# Patient Record
Sex: FEMALE | Race: WHITE | NOT HISPANIC OR LATINO | URBAN - METROPOLITAN AREA
[De-identification: names, ages, dates, MRNs, and addresses within clinical notes are randomized per-mention and may not be internally consistent; named-entity substitution may affect disease eponyms.]

---

## 2020-05-05 ENCOUNTER — OUTPATIENT (OUTPATIENT)
Dept: OUTPATIENT SERVICES | Facility: HOSPITAL | Age: 60
LOS: 1 days | End: 2020-05-05

## 2020-05-05 ENCOUNTER — INPATIENT (INPATIENT)
Facility: HOSPITAL | Age: 60
LOS: 4 days | Discharge: HOME | End: 2020-05-10
Admitting: STUDENT IN AN ORGANIZED HEALTH CARE EDUCATION/TRAINING PROGRAM
Payer: MEDICAID

## 2020-05-05 PROCEDURE — 70450 CT HEAD/BRAIN W/O DYE: CPT | Mod: 26

## 2020-05-05 PROCEDURE — 99285 EMERGENCY DEPT VISIT HI MDM: CPT

## 2020-05-05 PROCEDURE — 71045 X-RAY EXAM CHEST 1 VIEW: CPT | Mod: 26

## 2020-05-06 ENCOUNTER — OUTPATIENT (OUTPATIENT)
Dept: OUTPATIENT SERVICES | Facility: HOSPITAL | Age: 60
LOS: 1 days | End: 2020-05-06

## 2020-05-06 PROCEDURE — 71046 X-RAY EXAM CHEST 2 VIEWS: CPT | Mod: 26

## 2020-05-06 PROCEDURE — 73721 MRI JNT OF LWR EXTRE W/O DYE: CPT | Mod: 26,RT

## 2020-05-07 ENCOUNTER — OUTPATIENT (OUTPATIENT)
Dept: OUTPATIENT SERVICES | Facility: HOSPITAL | Age: 60
LOS: 1 days | End: 2020-05-07

## 2020-05-07 PROCEDURE — 93971 EXTREMITY STUDY: CPT | Mod: 26,RT

## 2020-05-07 PROCEDURE — 93926 LOWER EXTREMITY STUDY: CPT | Mod: 26,RT

## 2020-05-07 PROCEDURE — 93306 TTE W/DOPPLER COMPLETE: CPT | Mod: 26

## 2020-05-08 ENCOUNTER — OUTPATIENT (OUTPATIENT)
Dept: OUTPATIENT SERVICES | Facility: HOSPITAL | Age: 60
LOS: 1 days | End: 2020-05-08

## 2020-05-09 ENCOUNTER — OUTPATIENT (OUTPATIENT)
Dept: OUTPATIENT SERVICES | Facility: HOSPITAL | Age: 60
LOS: 1 days | End: 2020-05-09

## 2020-05-10 ENCOUNTER — OUTPATIENT (OUTPATIENT)
Dept: OUTPATIENT SERVICES | Facility: HOSPITAL | Age: 60
LOS: 1 days | End: 2020-05-10

## 2020-10-02 ENCOUNTER — RESULT REVIEW (OUTPATIENT)
Age: 60
End: 2020-10-02

## 2021-11-11 ENCOUNTER — APPOINTMENT (OUTPATIENT)
Dept: CARDIOLOGY | Facility: CLINIC | Age: 61
End: 2021-11-11
Payer: MEDICAID

## 2021-11-11 ENCOUNTER — NON-APPOINTMENT (OUTPATIENT)
Age: 61
End: 2021-11-11

## 2021-11-11 VITALS
HEART RATE: 70 BPM | OXYGEN SATURATION: 97 % | BODY MASS INDEX: 43.59 KG/M2 | SYSTOLIC BLOOD PRESSURE: 134 MMHG | DIASTOLIC BLOOD PRESSURE: 82 MMHG | WEIGHT: 246 LBS | HEIGHT: 63 IN | TEMPERATURE: 97.3 F

## 2021-11-11 DIAGNOSIS — L97.309 NON-PRESSURE CHRONIC ULCER OF UNSPECIFIED ANKLE WITH UNSPECIFIED SEVERITY: ICD-10-CM

## 2021-11-11 DIAGNOSIS — Z87.09 PERSONAL HISTORY OF OTHER DISEASES OF THE RESPIRATORY SYSTEM: ICD-10-CM

## 2021-11-11 DIAGNOSIS — I87.2 VENOUS INSUFFICIENCY (CHRONIC) (PERIPHERAL): ICD-10-CM

## 2021-11-11 DIAGNOSIS — F17.200 NICOTINE DEPENDENCE, UNSPECIFIED, UNCOMPLICATED: ICD-10-CM

## 2021-11-11 DIAGNOSIS — E66.3 OVERWEIGHT: ICD-10-CM

## 2021-11-11 DIAGNOSIS — L97.909 VARICOSE VEINS OF UNSPECIFIED LOWER EXTREMITY WITH ULCER OF UNSPECIFIED SITE: ICD-10-CM

## 2021-11-11 DIAGNOSIS — Z72.0 TOBACCO USE: ICD-10-CM

## 2021-11-11 DIAGNOSIS — I83.009 VARICOSE VEINS OF UNSPECIFIED LOWER EXTREMITY WITH ULCER OF UNSPECIFIED SITE: ICD-10-CM

## 2021-11-11 DIAGNOSIS — Z00.00 ENCOUNTER FOR GENERAL ADULT MEDICAL EXAMINATION W/OUT ABNORMAL FINDINGS: ICD-10-CM

## 2021-11-11 DIAGNOSIS — Z78.9 OTHER SPECIFIED HEALTH STATUS: ICD-10-CM

## 2021-11-11 DIAGNOSIS — R06.02 SHORTNESS OF BREATH: ICD-10-CM

## 2021-11-11 PROCEDURE — 93000 ELECTROCARDIOGRAM COMPLETE: CPT

## 2021-11-11 PROCEDURE — 99204 OFFICE O/P NEW MOD 45 MIN: CPT

## 2021-11-11 NOTE — DISCUSSION/SUMMARY
[FreeTextEntry1] : I will get an echo to evaluate left ventricular function and pulmonary pressures.  She will get fasting blood work to assess her lipid profile.  She will return in 2 months to discuss the results

## 2021-11-11 NOTE — REASON FOR VISIT
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

## 2021-11-11 NOTE — HISTORY OF PRESENT ILLNESS
[FreeTextEntry1] : She has no chest pain\par She has  shortness of breath\par She has no palpitations\par She has no syncope\par She is neurologically intact\par She has  edema\par She has no skin rashes\par

## 2021-11-11 NOTE — PHYSICAL EXAM
[Well Developed] : well developed [Well Nourished] : well nourished [No Acute Distress] : no acute distress [Normal Conjunctiva] : normal conjunctiva [Normal Venous Pressure] : normal venous pressure [No Carotid Bruit] : no carotid bruit [Normal S1, S2] : normal S1, S2 [No Murmur] : no murmur [No Rub] : no rub [No Gallop] : no gallop [Clear Lung Fields] : clear lung fields [Good Air Entry] : good air entry [No Respiratory Distress] : no respiratory distress  [Soft] : abdomen soft [Non Tender] : non-tender [No Masses/organomegaly] : no masses/organomegaly [Normal Bowel Sounds] : normal bowel sounds [Normal Gait] : normal gait [No Cyanosis] : no cyanosis [No Clubbing] : no clubbing [No Rash] : no rash [No Skin Lesions] : no skin lesions [Moves all extremities] : moves all extremities [No Focal Deficits] : no focal deficits [Normal Speech] : normal speech [Normal] : alert and oriented, normal memory [Alert and Oriented] : alert and oriented [Normal memory] : normal memory [de-identified] : Obese

## 2021-11-11 NOTE — REVIEW OF SYSTEMS
[SOB] : shortness of breath [Dyspnea on exertion] : dyspnea during exertion [Lower Ext Edema] : lower extremity edema [Negative] : Heme/Lymph [Chest Discomfort] : no chest discomfort [Leg Claudication] : no intermittent leg claudication [Palpitations] : no palpitations [Orthopnea] : no orthopnea [PND] : no PND [Syncope] : no syncope

## 2021-12-08 ENCOUNTER — APPOINTMENT (OUTPATIENT)
Dept: CARDIOLOGY | Facility: CLINIC | Age: 61
End: 2021-12-08
Payer: MEDICAID

## 2021-12-08 PROCEDURE — 93306 TTE W/DOPPLER COMPLETE: CPT

## 2022-01-25 ENCOUNTER — APPOINTMENT (OUTPATIENT)
Dept: CARDIOLOGY | Facility: CLINIC | Age: 62
End: 2022-01-25

## 2023-08-11 ENCOUNTER — OFFICE (OUTPATIENT)
Dept: URBAN - METROPOLITAN AREA CLINIC 9 | Facility: CLINIC | Age: 63
Setting detail: OPHTHALMOLOGY
End: 2023-08-11
Payer: MEDICAID

## 2023-08-11 DIAGNOSIS — H35.363: ICD-10-CM

## 2023-08-11 DIAGNOSIS — H57.813: ICD-10-CM

## 2023-08-11 DIAGNOSIS — H40.033: ICD-10-CM

## 2023-08-11 DIAGNOSIS — H25.13: ICD-10-CM

## 2023-08-11 DIAGNOSIS — H52.4: ICD-10-CM

## 2023-08-11 PROCEDURE — 92202 OPSCPY EXTND ON/MAC DRAW: CPT | Performed by: OPHTHALMOLOGY

## 2023-08-11 PROCEDURE — 92015 DETERMINE REFRACTIVE STATE: CPT | Performed by: OPHTHALMOLOGY

## 2023-08-11 PROCEDURE — 92014 COMPRE OPH EXAM EST PT 1/>: CPT | Performed by: OPHTHALMOLOGY

## 2023-08-11 ASSESSMENT — TONOMETRY
OD_IOP_MMHG: 18
OS_IOP_MMHG: 19

## 2023-08-11 ASSESSMENT — REFRACTION_MANIFEST
OS_ADD: +2.75
OS_AXIS: 170
OD_VA2: 20/20(J1+)
OD_ADD: +3.00
OS_CYLINDER: +0.50
OD_CYLINDER: +0.25
OD_CYLINDER: +0.25
OD_AXIS: 055
OS_CYLINDER: +0.50
OD_VA2: 20/20(J1+)
OS_ADD: +3.00
OS_VA2: 20/20(J1+)
OS_VA2: 20/20(J1+)
OD_ADD: +2.75
OS_SPHERE: +2.75
OD_VA1: 20/20
OD_SPHERE: +3.00
OD_VA1: 20/20
OU_VA: 20/20
OS_VA1: 20/20
OD_SPHERE: +3.00
OD_AXIS: 055
OU_VA: 20/20
OS_SPHERE: +2.75
OS_VA1: 20/20
OS_AXIS: 170

## 2023-08-11 ASSESSMENT — REFRACTION_AUTOREFRACTION
OS_CYLINDER: +1.00
OD_CYLINDER: +0.75
OS_AXIS: 170
OS_SPHERE: +2.25
OD_AXIS: 005
OD_SPHERE: +2.75

## 2023-08-11 ASSESSMENT — AXIALLENGTH_DERIVED
OD_AL: 22.1472
OS_AL: 22.2767
OS_AL: 22.1902
OD_AL: 22.1472
OD_AL: 22.1472
OS_AL: 22.1902

## 2023-08-11 ASSESSMENT — KERATOMETRY
OD_AXISANGLE_DEGREES: 092
METHOD_AUTO_MANUAL: AUTO
OD_K1POWER_DIOPTERS: 43.75
OD_K2POWER_DIOPTERS: 45.00
OS_AXISANGLE_DEGREES: 085
OS_K2POWER_DIOPTERS: 44.75
OS_K1POWER_DIOPTERS: 44.00

## 2023-08-11 ASSESSMENT — SPHEQUIV_DERIVED
OD_SPHEQUIV: 3.125
OS_SPHEQUIV: 3
OD_SPHEQUIV: 3.125
OS_SPHEQUIV: 3
OD_SPHEQUIV: 3.125
OS_SPHEQUIV: 2.75

## 2023-08-11 ASSESSMENT — REFRACTION_CURRENTRX
OD_OVR_VA: 20/
OS_OVR_VA: 20/
OD_VPRISM_DIRECTION: PROGS
OS_SPHERE: +2.50
OD_SPHERE: +2.25
OD_CYLINDER: +0.25
OS_CYLINDER: SPH
OD_ADD: +2.75
OD_AXIS: 054
OS_ADD: +2.50
OS_VPRISM_DIRECTION: PROGS

## 2023-08-11 ASSESSMENT — LID POSITION - COMMENTS
OS_COMMENTS: BROW PTOSIS
OD_COMMENTS: BROW PTOSIS

## 2023-08-11 ASSESSMENT — VISUAL ACUITY
OS_BCVA: 20/30-2
OD_BCVA: 20/25-

## 2023-08-11 ASSESSMENT — CONFRONTATIONAL VISUAL FIELD TEST (CVF)
OS_FINDINGS: FULL
OD_FINDINGS: FULL

## 2024-02-23 ENCOUNTER — OFFICE (OUTPATIENT)
Dept: URBAN - METROPOLITAN AREA CLINIC 9 | Facility: CLINIC | Age: 64
Setting detail: OPHTHALMOLOGY
End: 2024-02-23
Payer: MEDICAID

## 2024-02-23 DIAGNOSIS — H35.363: ICD-10-CM

## 2024-02-23 DIAGNOSIS — H52.4: ICD-10-CM

## 2024-02-23 DIAGNOSIS — H25.13: ICD-10-CM

## 2024-02-23 DIAGNOSIS — H57.813: ICD-10-CM

## 2024-02-23 DIAGNOSIS — H40.033: ICD-10-CM

## 2024-02-23 PROCEDURE — 92015 DETERMINE REFRACTIVE STATE: CPT | Performed by: OPHTHALMOLOGY

## 2024-02-23 PROCEDURE — 92134 CPTRZ OPH DX IMG PST SGM RTA: CPT | Performed by: OPHTHALMOLOGY

## 2024-02-23 PROCEDURE — 99213 OFFICE O/P EST LOW 20 MIN: CPT | Performed by: OPHTHALMOLOGY

## 2024-02-23 ASSESSMENT — REFRACTION_AUTOREFRACTION
OD_SPHERE: +2.75
OS_SPHERE: +2.25
OD_CYLINDER: +0.75
OS_CYLINDER: +1.00
OS_AXIS: 170
OD_AXIS: 005

## 2024-02-23 ASSESSMENT — REFRACTION_CURRENTRX
OD_AXIS: 054
OS_VPRISM_DIRECTION: PROGS
OD_SPHERE: +2.25
OD_ADD: +2.75
OS_OVR_VA: 20/
OD_VPRISM_DIRECTION: PROGS
OD_OVR_VA: 20/
OS_ADD: +2.75
OS_CYLINDER: SPH
OD_CYLINDER: +0.25
OS_SPHERE: +2.50

## 2024-02-23 ASSESSMENT — REFRACTION_MANIFEST
OS_SPHERE: +2.75
OS_CYLINDER: +0.50
OD_VA1: 20/20
OS_CYLINDER: +0.50
OD_CYLINDER: +0.25
OD_VA2: 20/20(J1+)
OD_VA2: 20/20(J1+)
OS_ADD: +2.75
OS_VA2: 20/20(J1+)
OU_VA: 20/20
OS_ADD: +3.00
OD_CYLINDER: +0.25
OU_VA: 20/20
OS_SPHERE: +2.75
OD_SPHERE: +3.00
OD_AXIS: 055
OD_AXIS: 055
OS_AXIS: 170
OS_AXIS: 170
OD_ADD: +3.00
OS_VA1: 20/20
OS_VA2: 20/20(J1+)
OS_VA1: 20/20
OD_VA1: 20/20
OD_ADD: +2.75
OD_SPHERE: +3.00

## 2024-02-23 ASSESSMENT — CONFRONTATIONAL VISUAL FIELD TEST (CVF)
OD_FINDINGS: FULL
OS_FINDINGS: FULL

## 2024-02-23 ASSESSMENT — SPHEQUIV_DERIVED
OD_SPHEQUIV: 3.125
OS_SPHEQUIV: 2.75
OD_SPHEQUIV: 3.125
OS_SPHEQUIV: 3
OD_SPHEQUIV: 3.125
OS_SPHEQUIV: 3

## 2024-02-23 ASSESSMENT — LID POSITION - COMMENTS
OD_COMMENTS: BROW PTOSIS
OS_COMMENTS: BROW PTOSIS

## 2024-04-01 ENCOUNTER — TRANSCRIPTION ENCOUNTER (OUTPATIENT)
Age: 64
End: 2024-04-01

## 2024-04-02 ENCOUNTER — RESULT REVIEW (OUTPATIENT)
Age: 64
End: 2024-04-02

## 2024-04-03 ENCOUNTER — TRANSCRIPTION ENCOUNTER (OUTPATIENT)
Age: 64
End: 2024-04-03

## 2024-07-17 ENCOUNTER — NON-APPOINTMENT (OUTPATIENT)
Age: 64
End: 2024-07-17

## 2024-07-17 VITALS — BODY MASS INDEX: 42.17 KG/M2 | HEIGHT: 63 IN | WEIGHT: 238 LBS

## 2024-07-17 DIAGNOSIS — Z87.891 PERSONAL HISTORY OF NICOTINE DEPENDENCE: ICD-10-CM

## 2024-07-19 ENCOUNTER — APPOINTMENT (OUTPATIENT)
Dept: ULTRASOUND IMAGING | Facility: CLINIC | Age: 64
End: 2024-07-19
Payer: MEDICAID

## 2024-07-19 PROCEDURE — 76705 ECHO EXAM OF ABDOMEN: CPT | Mod: 59

## 2024-07-19 PROCEDURE — 76981 USE PARENCHYMA: CPT

## 2024-08-08 ENCOUNTER — APPOINTMENT (OUTPATIENT)
Dept: INFECTIOUS DISEASE | Facility: CLINIC | Age: 64
End: 2024-08-08

## 2024-08-08 PROBLEM — K74.60 CIRRHOSIS, NONALCOHOLIC: Status: ACTIVE | Noted: 2024-08-08

## 2024-08-08 PROBLEM — B19.20 HEPATITIS-C: Status: ACTIVE | Noted: 2024-08-08

## 2024-08-08 PROBLEM — G47.30 SLEEP APNEA: Status: ACTIVE | Noted: 2024-08-08

## 2024-08-08 PROCEDURE — 99204 OFFICE O/P NEW MOD 45 MIN: CPT

## 2024-08-08 NOTE — REVIEW OF SYSTEMS
[Feeling Tired] : feeling tired [Feeling Sick] : feeling sick [As Noted in HPI] : as noted in HPI [Skin Lesions] : skin lesion [Skin Wound] : skin wound [Itching] : itching [Negative] : Heme/Lymph

## 2024-08-08 NOTE — PHYSICAL EXAM
[General Appearance - Alert] : alert [General Appearance - In No Acute Distress] : in no acute distress [Sclera] : the sclera and conjunctiva were normal [PERRL With Normal Accommodation] : pupils were equal in size, round, reactive to light [Extraocular Movements] : extraocular movements were intact [Outer Ear] : the ears and nose were normal in appearance [Oropharynx] : the oropharynx was normal with no thrush [Neck Appearance] : the appearance of the neck was normal [Neck Cervical Mass (___cm)] : no neck mass was observed [Jugular Venous Distention Increased] : there was no jugular-venous distention [Thyroid Diffuse Enlargement] : the thyroid was not enlarged [Auscultation Breath Sounds / Voice Sounds] : lungs were clear to auscultation bilaterally [Heart Sounds] : normal S1 and S2 [Heart Rate And Rhythm] : heart rate was normal and rhythm regular [Heart Sounds Gallop] : no gallops [Murmurs] : no murmurs [Heart Sounds Pericardial Friction Rub] : no pericardial rub [Full Pulse] : the pedal pulses are present [Bowel Sounds] : normal bowel sounds [Abdomen Soft] : soft [Abdomen Tenderness] : non-tender [Abdomen Mass (___ Cm)] : no abdominal mass palpated [Costovertebral Angle Tenderness] : no CVA tenderness [No Palpable Adenopathy] : no palpable adenopathy [Musculoskeletal - Swelling] : no joint swelling [Motor Tone] : muscle strength and tone were normal [Nail Clubbing] : no clubbing  or cyanosis of the fingernails [Skin Color & Pigmentation] : normal skin color and pigmentation [] : no rash [FreeTextEntry1] : ULCER in the right medial lower leg, about 3 cm across, clean base.  [Cranial Nerves] : cranial nerves 2-12 were intact [Sensation] : the sensory exam was normal to light touch and pinprick [No Focal Deficits] : no focal deficits [Oriented To Time, Place, And Person] : oriented to person, place, and time [Affect] : the affect was normal

## 2024-08-08 NOTE — DATA REVIEWED
[FreeTextEntry1] : Laboratory/Data Review: - Analysis of lab results reported high AST and ALT levels in the liver. Hepatitis C antibody was undetected, but a high volume of Hepatitis C RNA was found in her blood, indicating active uncontrolled Hepatitis C. Her platelet count was low, suggesting an infection.  It's July 1, 2024  AST was 96, your ALT was 70,   hepatitis C antibody is detected. 2.4 million copies of hepatitis C RNA   It is a log scale of 6.38.  May of 2023, AST was 77, your AMT was 68.  IN  2022,  AST was 81, ALT was 79.  ultrasound  liver on July 19, 2024, shows that your liver is small size, coarse texture, but no liver mass. - high risk of clinical significant fibrosis of the liver.

## 2024-08-08 NOTE — HISTORY OF PRESENT ILLNESS
[FreeTextEntry1] :  This 64 y.o. F with obesity.  hepatitis C and B, hx of Leg ulcers and cellulitis,  hx of Lymphedema.    - The patient reports having vascular issues with legs which have led to ulcers.  Her vascular surgeon is Dr. Lopez is the surgeon at [facility]. It was found that these issues were hereditary, as her father  from cellulitis and sepsis from the leg. She has been seeing a vascular doctor to treat these ulcers. The patient also revealed she had history of drug use, specifically heroin which she used to inject. She stopped using it a long time ago with the last relapse five years ago. However, it was found that she had also contracted Hepatitis B and C, the latter of which she tested positive without having gotten sick from it. Her current primary doctor, Dr. Jessica Jin, noticed that her liver levels were significantly high and suggested they might be linked to her Hepatitis C. She was also concerned that her recurrent cellulitis and leg problems might be related to the hepatitis. Other issues she reported include experiencing a brain fog which, based on her symptoms and having reported having sleep apnea, could be due to lack of proper sleep.  Past Medical History (PMH): - The patient's past medical history includes heroin addiction, Hepatitis B and C, and vascular issues leading to ulcers in her lower extremities.  Family History (FH): - The patient's father  from cellulitis and sepsis from his leg, highlighting a potential genetic predisposition to vascular issues.  Social History (SH): - The patient revealed a past history of drug use, specifically injecting heroin. She hasn't had a relapse in five years. She was once a  and currently struggles with weight issues. She recently quit smoking. She is on methadone currently, and had several prior relapses.

## 2024-08-08 NOTE — ASSESSMENT
[FreeTextEntry1] : Assessment: - The patient has active and uncontrolled Hepatitis C that has likely resulted in cirrhosis of the liver. She is also dealing with lymphedema, vascular ulcers, and recurrent cellulitis, all of which are potentially causing significant discomfort and pain. Her overall sleeping pattern is also a concern due to potential sleep apnea, which might be contributing to the patient's reported 'brain fog'.  Plan: - The patient is advised to consult a liver specialist (suggesting Dr. Rincon) as soon as possible to start treatment for her active Hepatitis C.  She has been tested negative for HIV in the past.  Consider interval testing this year.   For her leg ulcers, she should continue to receive treatment from her vascular doctor and try to manage her lymphedema with wraps or unna boots.  At the currently time, defer antibiotics  She is also advised to maintain a healthier diet and engage in moderate exercise as a way to manage her overall health and lose weight, which may help to decreased to abd pressure on the vasculature.   . A follow-up appointment is scheduled in two months to assess progress. She's also recommended to confirm her sleep apnea diagnosis and seek relevant treatment.

## 2024-08-12 DIAGNOSIS — N18.9 CHRONIC KIDNEY DISEASE, UNSPECIFIED: ICD-10-CM

## 2024-08-12 DIAGNOSIS — Z78.0 ASYMPTOMATIC MENOPAUSAL STATE: ICD-10-CM

## 2024-08-12 DIAGNOSIS — F17.200 NICOTINE DEPENDENCE, UNSPECIFIED, UNCOMPLICATED: ICD-10-CM

## 2024-08-12 DIAGNOSIS — Z80.1 FAMILY HISTORY OF MALIGNANT NEOPLASM OF TRACHEA, BRONCHUS AND LUNG: ICD-10-CM

## 2024-08-12 DIAGNOSIS — F19.90 OTHER PSYCHOACTIVE SUBSTANCE USE, UNSPECIFIED, UNCOMPLICATED: ICD-10-CM

## 2024-08-12 DIAGNOSIS — F32.A DEPRESSION, UNSPECIFIED: ICD-10-CM

## 2024-08-12 DIAGNOSIS — I89.0 LYMPHEDEMA, NOT ELSEWHERE CLASSIFIED: ICD-10-CM

## 2024-08-12 DIAGNOSIS — Z87.891 PERSONAL HISTORY OF NICOTINE DEPENDENCE: ICD-10-CM

## 2024-08-12 DIAGNOSIS — Z87.898 PERSONAL HISTORY OF OTHER SPECIFIED CONDITIONS: ICD-10-CM

## 2024-08-12 DIAGNOSIS — J44.9 CHRONIC OBSTRUCTIVE PULMONARY DISEASE, UNSPECIFIED: ICD-10-CM

## 2024-08-12 DIAGNOSIS — Z83.1 FAMILY HISTORY OF OTHER INFECTIOUS AND PARASITIC DISEASES: ICD-10-CM

## 2024-08-12 RX ORDER — METHADONE HYDROCHLORIDE 5 MG/1
TABLET ORAL
Refills: 0 | Status: ACTIVE | COMMUNITY

## 2024-08-20 ENCOUNTER — APPOINTMENT (OUTPATIENT)
Dept: MAMMOGRAPHY | Facility: CLINIC | Age: 64
End: 2024-08-20
Payer: MEDICAID

## 2024-08-20 ENCOUNTER — APPOINTMENT (OUTPATIENT)
Dept: CT IMAGING | Facility: CLINIC | Age: 64
End: 2024-08-20

## 2024-08-20 PROCEDURE — 77063 BREAST TOMOSYNTHESIS BI: CPT

## 2024-08-20 PROCEDURE — 77067 SCR MAMMO BI INCL CAD: CPT

## 2024-08-20 PROCEDURE — 71271 CT THORAX LUNG CANCER SCR C-: CPT

## 2024-08-27 ENCOUNTER — OFFICE (OUTPATIENT)
Dept: URBAN - METROPOLITAN AREA CLINIC 38 | Facility: CLINIC | Age: 64
Setting detail: OPHTHALMOLOGY
End: 2024-08-27
Payer: MEDICAID

## 2024-08-27 ENCOUNTER — RX ONLY (RX ONLY)
Age: 64
End: 2024-08-27

## 2024-08-27 VITALS — HEIGHT: 55 IN

## 2024-08-27 DIAGNOSIS — H40.033: ICD-10-CM

## 2024-08-27 DIAGNOSIS — H35.363: ICD-10-CM

## 2024-08-27 DIAGNOSIS — H25.13: ICD-10-CM

## 2024-08-27 PROBLEM — H50.52 EXOPHORIA: Status: ACTIVE | Noted: 2024-08-27

## 2024-08-27 PROCEDURE — 92014 COMPRE OPH EXAM EST PT 1/>: CPT | Performed by: OPHTHALMOLOGY

## 2024-08-27 ASSESSMENT — LID POSITION - COMMENTS
OD_COMMENTS: BROW PTOSIS
OS_COMMENTS: BROW PTOSIS

## 2024-08-27 ASSESSMENT — CONFRONTATIONAL VISUAL FIELD TEST (CVF)
OD_FINDINGS: FULL
OS_FINDINGS: FULL

## 2024-09-04 ENCOUNTER — APPOINTMENT (OUTPATIENT)
Dept: CT IMAGING | Facility: CLINIC | Age: 64
End: 2024-09-04

## 2024-09-17 ENCOUNTER — OFFICE (OUTPATIENT)
Dept: URBAN - METROPOLITAN AREA CLINIC 38 | Facility: CLINIC | Age: 64
Setting detail: OPHTHALMOLOGY
End: 2024-09-17
Payer: MEDICAID

## 2024-09-17 DIAGNOSIS — H57.813: ICD-10-CM

## 2024-09-17 DIAGNOSIS — H35.363: ICD-10-CM

## 2024-09-17 DIAGNOSIS — H40.033: ICD-10-CM

## 2024-09-17 DIAGNOSIS — H25.13: ICD-10-CM

## 2024-09-17 DIAGNOSIS — H50.52: ICD-10-CM

## 2024-09-17 PROCEDURE — 99213 OFFICE O/P EST LOW 20 MIN: CPT | Performed by: OPHTHALMOLOGY

## 2024-09-17 ASSESSMENT — CONFRONTATIONAL VISUAL FIELD TEST (CVF)
OS_FINDINGS: FULL
OD_FINDINGS: FULL

## 2024-09-17 ASSESSMENT — LID POSITION - COMMENTS
OD_COMMENTS: BROW PTOSIS
OS_COMMENTS: BROW PTOSIS

## 2024-10-09 ENCOUNTER — APPOINTMENT (OUTPATIENT)
Dept: INFECTIOUS DISEASE | Facility: CLINIC | Age: 64
End: 2024-10-09

## 2025-01-06 ENCOUNTER — APPOINTMENT (OUTPATIENT)
Dept: GASTROENTEROLOGY | Facility: CLINIC | Age: 65
End: 2025-01-06
Payer: MEDICAID

## 2025-01-06 VITALS
OXYGEN SATURATION: 96 % | SYSTOLIC BLOOD PRESSURE: 169 MMHG | BODY MASS INDEX: 44.65 KG/M2 | TEMPERATURE: 97.5 F | HEIGHT: 63 IN | RESPIRATION RATE: 14 BRPM | HEART RATE: 89 BPM | WEIGHT: 252 LBS | DIASTOLIC BLOOD PRESSURE: 89 MMHG

## 2025-01-06 DIAGNOSIS — R79.89 OTHER SPECIFIED ABNORMAL FINDINGS OF BLOOD CHEMISTRY: ICD-10-CM

## 2025-01-06 DIAGNOSIS — B19.20 UNSPECIFIED VIRAL HEPATITIS C W/OUT HEPATIC COMA: ICD-10-CM

## 2025-01-06 PROCEDURE — 99204 OFFICE O/P NEW MOD 45 MIN: CPT

## 2025-01-06 RX ORDER — UBIDECARENONE/VIT E ACET 100MG-5
CAPSULE ORAL
Refills: 0 | Status: ACTIVE | COMMUNITY

## 2025-02-19 ENCOUNTER — APPOINTMENT (OUTPATIENT)
Dept: CT IMAGING | Facility: CLINIC | Age: 65
End: 2025-02-19

## 2025-03-18 ENCOUNTER — OFFICE (OUTPATIENT)
Dept: URBAN - METROPOLITAN AREA CLINIC 38 | Facility: CLINIC | Age: 65
Setting detail: OPHTHALMOLOGY
End: 2025-03-18
Payer: MEDICAID

## 2025-03-18 ENCOUNTER — RX ONLY (RX ONLY)
Age: 65
End: 2025-03-18

## 2025-03-18 DIAGNOSIS — H25.13: ICD-10-CM

## 2025-03-18 DIAGNOSIS — H35.363: ICD-10-CM

## 2025-03-18 DIAGNOSIS — H52.4: ICD-10-CM

## 2025-03-18 DIAGNOSIS — H40.033: ICD-10-CM

## 2025-03-18 PROCEDURE — 92015 DETERMINE REFRACTIVE STATE: CPT | Performed by: OPHTHALMOLOGY

## 2025-03-18 PROCEDURE — 92014 COMPRE OPH EXAM EST PT 1/>: CPT | Performed by: OPHTHALMOLOGY

## 2025-03-18 ASSESSMENT — REFRACTION_AUTOREFRACTION
OS_AXIS: 076
OS_CYLINDER: -1.50
OD_CYLINDER: -1.00
OD_AXIS: 113
OD_SPHERE: +4.00
OS_SPHERE: +4.00

## 2025-03-18 ASSESSMENT — VISUAL ACUITY
OS_BCVA: 20/30-
OD_BCVA: 20/30-

## 2025-03-18 ASSESSMENT — REFRACTION_CURRENTRX
OD_VPRISM_DIRECTION: PROGS
OS_SPHERE: +2.25
OD_ADD: +2.50
OD_AXIS: 167
OS_ADD: +2.50
OD_OVR_VA: 20/
OS_OVR_VA: 20/
OD_SPHERE: +2.50
OS_VPRISM_DIRECTION: PROGS
OS_CYLINDER: SPH
OD_CYLINDER: -0.25

## 2025-03-18 ASSESSMENT — REFRACTION_MANIFEST
OD_CYLINDER: -0.50
OU_VA: 20/20-
OD_VA2: 20/20
OS_VA1: 20/30
OD_VA1: 20/30
OS_CYLINDER: -0.50
OD_AXIS: 110
OS_VA2: 20/20
OS_VA1: 20/30
OU_VA: 20/20-
OS_SPHERE: +2.50
OD_ADD: +2.75
OS_VA2: 20/20
OS_ADD: +2.75
OD_VA1: 20/30
OS_AXIS: 75
OD_SPHERE: +3.25
OS_SPHERE: +3.00
OD_SPHERE: +3.25
OD_ADD: +2.75
OS_ADD: +2.75
OD_CYLINDER: SPHERE
OS_CYLINDER: SPHERE
OD_VA2: 20/20

## 2025-03-18 ASSESSMENT — KERATOMETRY
METHOD_AUTO_MANUAL: AUTO
OS_AXISANGLE_DEGREES: 104
OS_K2POWER_DIOPTERS: 44.50
OD_K1POWER_DIOPTERS: 47.75
OD_K2POWER_DIOPTERS: 50.25
OD_AXISANGLE_DEGREES: 161
OS_K1POWER_DIOPTERS: 44.00

## 2025-03-18 ASSESSMENT — CONFRONTATIONAL VISUAL FIELD TEST (CVF)
OS_FINDINGS: FULL
OD_FINDINGS: FULL

## 2025-03-18 ASSESSMENT — LID POSITION - COMMENTS
OD_COMMENTS: BROW PTOSIS
OS_COMMENTS: BROW PTOSIS

## 2025-05-28 ENCOUNTER — APPOINTMENT (OUTPATIENT)
Dept: GASTROENTEROLOGY | Facility: CLINIC | Age: 65
End: 2025-05-28